# Patient Record
(demographics unavailable — no encounter records)

---

## 2025-03-25 NOTE — ASSESSMENT
[FreeTextEntry1] : Patient is a 61 yo M who presents for BPH/LUTS.  LUTS significantly improved on flomax - was renewed a couple weeks ago  He requests flomax to [facility] as a cheaper alternative PVR today 0cc PSA 3/18/25 was 2.07  F/u 1 yr